# Patient Record
Sex: FEMALE | Race: WHITE | NOT HISPANIC OR LATINO | Employment: OTHER | URBAN - METROPOLITAN AREA
[De-identification: names, ages, dates, MRNs, and addresses within clinical notes are randomized per-mention and may not be internally consistent; named-entity substitution may affect disease eponyms.]

---

## 2022-06-06 DIAGNOSIS — G47.00 INSOMNIA: Primary | ICD-10-CM

## 2024-04-01 ENCOUNTER — LAB REQUISITION (OUTPATIENT)
Dept: LAB | Facility: CLINIC | Age: 74
End: 2024-04-01
Payer: COMMERCIAL

## 2024-04-01 DIAGNOSIS — D64.9 ANEMIA, UNSPECIFIED: ICD-10-CM

## 2024-04-02 LAB — HGB BLD-MCNC: 8.7 G/DL (ref 11.7–15.7)

## 2024-04-02 PROCEDURE — 85018 HEMOGLOBIN: CPT | Performed by: INTERNAL MEDICINE

## 2024-04-02 PROCEDURE — P9604 ONE-WAY ALLOW PRORATED TRIP: HCPCS | Performed by: INTERNAL MEDICINE

## 2024-04-02 PROCEDURE — 36415 COLL VENOUS BLD VENIPUNCTURE: CPT | Performed by: INTERNAL MEDICINE

## 2024-09-06 ENCOUNTER — HOSPITAL ENCOUNTER (EMERGENCY)
Facility: HOSPITAL | Age: 74
Discharge: HOME | End: 2024-09-07
Attending: EMERGENCY MEDICINE | Admitting: EMERGENCY MEDICINE
Payer: COMMERCIAL

## 2024-09-06 ENCOUNTER — APPOINTMENT (OUTPATIENT)
Dept: CT IMAGING | Facility: HOSPITAL | Age: 74
End: 2024-09-06
Attending: EMERGENCY MEDICINE
Payer: COMMERCIAL

## 2024-09-06 VITALS
SYSTOLIC BLOOD PRESSURE: 138 MMHG | WEIGHT: 110 LBS | OXYGEN SATURATION: 98 % | HEART RATE: 84 BPM | TEMPERATURE: 97 F | DIASTOLIC BLOOD PRESSURE: 76 MMHG | RESPIRATION RATE: 15 BRPM

## 2024-09-06 DIAGNOSIS — F10.929 ALCOHOLIC INTOXICATION WITH COMPLICATION (H): ICD-10-CM

## 2024-09-06 LAB
ALBUMIN SERPL BCG-MCNC: 3.9 G/DL (ref 3.5–5.2)
ALBUMIN UR-MCNC: NEGATIVE MG/DL
ALP SERPL-CCNC: 55 U/L (ref 40–150)
ALT SERPL W P-5'-P-CCNC: 11 U/L (ref 0–50)
AMPHETAMINES UR QL SCN: NORMAL
ANION GAP SERPL CALCULATED.3IONS-SCNC: 15 MMOL/L (ref 7–15)
APPEARANCE UR: CLEAR
AST SERPL W P-5'-P-CCNC: 26 U/L (ref 0–45)
BARBITURATES UR QL SCN: NORMAL
BASOPHILS # BLD AUTO: 0.1 10E3/UL (ref 0–0.2)
BASOPHILS NFR BLD AUTO: 1 %
BENZODIAZ UR QL SCN: NORMAL
BILIRUB DIRECT SERPL-MCNC: <0.2 MG/DL (ref 0–0.3)
BILIRUB SERPL-MCNC: <0.2 MG/DL
BILIRUB UR QL STRIP: NEGATIVE
BUN SERPL-MCNC: 16.9 MG/DL (ref 8–23)
BZE UR QL SCN: NORMAL
CALCIUM SERPL-MCNC: 8.7 MG/DL (ref 8.8–10.4)
CANNABINOIDS UR QL SCN: NORMAL
CHLORIDE SERPL-SCNC: 110 MMOL/L (ref 98–107)
COLOR UR AUTO: ABNORMAL
CREAT SERPL-MCNC: 0.75 MG/DL (ref 0.51–0.95)
EGFRCR SERPLBLD CKD-EPI 2021: 84 ML/MIN/1.73M2
EOSINOPHIL # BLD AUTO: 0.1 10E3/UL (ref 0–0.7)
EOSINOPHIL NFR BLD AUTO: 1 %
ERYTHROCYTE [DISTWIDTH] IN BLOOD BY AUTOMATED COUNT: 14.1 % (ref 10–15)
ETHANOL SERPL-MCNC: 0.28 G/DL
FENTANYL UR QL: NORMAL
GLUCOSE SERPL-MCNC: 95 MG/DL (ref 70–99)
GLUCOSE UR STRIP-MCNC: NEGATIVE MG/DL
HCO3 SERPL-SCNC: 20 MMOL/L (ref 22–29)
HCT VFR BLD AUTO: 40.1 % (ref 35–47)
HGB BLD-MCNC: 12.8 G/DL (ref 11.7–15.7)
HGB UR QL STRIP: NEGATIVE
HYALINE CASTS: 1 /LPF
IMM GRANULOCYTES # BLD: 0 10E3/UL
IMM GRANULOCYTES NFR BLD: 0 %
KETONES UR STRIP-MCNC: 20 MG/DL
LEUKOCYTE ESTERASE UR QL STRIP: NEGATIVE
LYMPHOCYTES # BLD AUTO: 4.3 10E3/UL (ref 0.8–5.3)
LYMPHOCYTES NFR BLD AUTO: 40 %
MAGNESIUM SERPL-MCNC: 1.9 MG/DL (ref 1.7–2.3)
MCH RBC QN AUTO: 29.6 PG (ref 26.5–33)
MCHC RBC AUTO-ENTMCNC: 31.9 G/DL (ref 31.5–36.5)
MCV RBC AUTO: 93 FL (ref 78–100)
MONOCYTES # BLD AUTO: 0.8 10E3/UL (ref 0–1.3)
MONOCYTES NFR BLD AUTO: 8 %
MUCOUS THREADS #/AREA URNS LPF: PRESENT /LPF
NEUTROPHILS # BLD AUTO: 5.2 10E3/UL (ref 1.6–8.3)
NEUTROPHILS NFR BLD AUTO: 49 %
NITRATE UR QL: NEGATIVE
NRBC # BLD AUTO: 0.1 10E3/UL
NRBC BLD AUTO-RTO: 1 /100
OPIATES UR QL SCN: NORMAL
PCP QUAL URINE (ROCHE): NORMAL
PH UR STRIP: 5.5 [PH] (ref 5–7)
PLAT MORPH BLD: ABNORMAL
PLATELET # BLD AUTO: ABNORMAL 10*3/UL
POTASSIUM SERPL-SCNC: 4.3 MMOL/L (ref 3.4–5.3)
PROT SERPL-MCNC: 6.4 G/DL (ref 6.4–8.3)
RBC # BLD AUTO: 4.32 10E6/UL (ref 3.8–5.2)
RBC MORPH BLD: ABNORMAL
RBC URINE: <1 /HPF
SODIUM SERPL-SCNC: 145 MMOL/L (ref 135–145)
SP GR UR STRIP: 1.02 (ref 1–1.03)
SQUAMOUS EPITHELIAL: 3 /HPF
UROBILINOGEN UR STRIP-MCNC: <2 MG/DL
WBC # BLD AUTO: 10.6 10E3/UL (ref 4–11)
WBC URINE: 1 /HPF

## 2024-09-06 PROCEDURE — 85041 AUTOMATED RBC COUNT: CPT | Performed by: EMERGENCY MEDICINE

## 2024-09-06 PROCEDURE — 99284 EMERGENCY DEPT VISIT MOD MDM: CPT | Mod: 25

## 2024-09-06 PROCEDURE — 83735 ASSAY OF MAGNESIUM: CPT | Performed by: EMERGENCY MEDICINE

## 2024-09-06 PROCEDURE — 82077 ASSAY SPEC XCP UR&BREATH IA: CPT | Performed by: EMERGENCY MEDICINE

## 2024-09-06 PROCEDURE — 36415 COLL VENOUS BLD VENIPUNCTURE: CPT | Performed by: EMERGENCY MEDICINE

## 2024-09-06 PROCEDURE — 80307 DRUG TEST PRSMV CHEM ANLYZR: CPT | Performed by: EMERGENCY MEDICINE

## 2024-09-06 PROCEDURE — 80053 COMPREHEN METABOLIC PANEL: CPT | Performed by: EMERGENCY MEDICINE

## 2024-09-06 PROCEDURE — 70450 CT HEAD/BRAIN W/O DYE: CPT

## 2024-09-06 PROCEDURE — 250N000013 HC RX MED GY IP 250 OP 250 PS 637: Performed by: EMERGENCY MEDICINE

## 2024-09-06 PROCEDURE — 81001 URINALYSIS AUTO W/SCOPE: CPT | Performed by: EMERGENCY MEDICINE

## 2024-09-06 PROCEDURE — 82248 BILIRUBIN DIRECT: CPT | Performed by: EMERGENCY MEDICINE

## 2024-09-06 RX ORDER — PROCHLORPERAZINE MALEATE 5 MG
5 TABLET ORAL ONCE
Status: COMPLETED | OUTPATIENT
Start: 2024-09-06 | End: 2024-09-06

## 2024-09-06 RX ORDER — GABAPENTIN 100 MG/1
300 CAPSULE ORAL ONCE
Status: COMPLETED | OUTPATIENT
Start: 2024-09-06 | End: 2024-09-06

## 2024-09-06 RX ADMIN — GABAPENTIN 300 MG: 100 CAPSULE ORAL at 22:40

## 2024-09-06 RX ADMIN — PROCHLORPERAZINE MALEATE 5 MG: 5 TABLET ORAL at 21:44

## 2024-09-06 ASSESSMENT — ACTIVITIES OF DAILY LIVING (ADL)
ADLS_ACUITY_SCORE: 35

## 2024-09-06 ASSESSMENT — COLUMBIA-SUICIDE SEVERITY RATING SCALE - C-SSRS
2. HAVE YOU ACTUALLY HAD ANY THOUGHTS OF KILLING YOURSELF IN THE PAST MONTH?: NO
6. HAVE YOU EVER DONE ANYTHING, STARTED TO DO ANYTHING, OR PREPARED TO DO ANYTHING TO END YOUR LIFE?: NO
1. IN THE PAST MONTH, HAVE YOU WISHED YOU WERE DEAD OR WISHED YOU COULD GO TO SLEEP AND NOT WAKE UP?: NO

## 2024-09-06 NOTE — ED TRIAGE NOTES
"Pt brought by EMS from [employer] due to alcohol intoxication. Pt lives alone [place] the [place] from [employer], and states she has been \"drinking for days\" and \"passing out and fall asleep\". Denies recent falls or injuries, denies suicidal ideation, denies drug abuse. Pt unable to walk due to intoxication, states she has been drinking vodka.  Glucose 112.        "

## 2024-09-06 NOTE — ED PROVIDER NOTES
EMERGENCY DEPARTMENT ENCOUNTER      NAME: Mallory Mckinney  AGE: 74 year old female  YOB: 1950  MRN: [id number]  EVALUATION DATE & TIME: 9/6/2024  5:04 PM    PCP: No Ref-Primary, Physician    ED PROVIDER: Angie Acevedo DO      Chief Complaint   Patient presents with    Alcohol Intoxication         FINAL IMPRESSION:  1. Alcoholic intoxication with complication (H24)          ED COURSE & MEDICAL DECISION MAKING:    Pertinent Labs & Imaging studies reviewed. (See chart for details)  5:13 PM I met the patient and performed my initial interview and exam.    74 year old female presents to the Emergency Department for evaluation of  alcohol intoxication.  Reportedly at the [employer] for home and someone called EMS.  Patient is disheveled and appears intoxicated here.  She reports she has been drinking quite a lot recently but is unable to tell me more.  She is oriented to place and self.  Difficult to obtain history.  No obvious signs of trauma but given intoxication altered mental status will obtain CT imaging of the head.  She tells me that she had been on gabapentin for some chronic right wrist pain, made her feel off so has been replacing this with alcohol.  She denies any thoughts of harm to self or others.  Labs are overall reassuring.  Blood alcohol level significantly elevated.  CT imaging of the head without fracture or bleed.  Patient observed in the ED for several hours.  She was eating and drinking and ambulating without difficulty.  I did discuss again with patient the room.  She admits to being an alcoholic.  She reports she has episodes of depression will binge drink.  She had been drinking for the past 3 days.  She reports she sometimes will go several weeks without drinking.  Sounds like her friends tend to drink a lot as well.  She does not drive.  Patient is now clinically sober.  She continues to denies any suicidal ideation.  She request discharge.  I think this is reasonable and  patient is not holdable.  Patient is calling a cab for ride home.  I gave her alcohol resources and did discuss return if any acute worsening symptoms or thought of self-harm.    At the conclusion of the encounter I discussed the results of all of the tests and the disposition. The questions were answered. The patient or family acknowledged understanding and was agreeable with the care plan.     Medical Decision Making  Obtained supplemental history:Supplemental history obtained?: No  Reviewed external records: External records reviewed?: Outpatient Record: Family Medicine Visit 6/25/24  Care impacted by chronic illness:Cancer/Chemotherapy, Hypertension, and Mental Health  Care significantly affected by social determinants of health:Alcohol Abuse and/or Recreational Drug Use  Did you consider but not order tests?: Work up considered but not performed and documented in chart, if applicable  Did you interpret images independently?: Independent interpretation of ECG and images noted in documentation, when applicable.  Consultation discussion with other provider:Did you involve another provider (consultant, MH, pharmacy, etc.)?: No  Discharge. No recommendations on prescription strength medication(s). I considered admission, but discharged patient after significant clinical improvement.      MEDICATIONS GIVEN IN THE EMERGENCY:  Medications   sodium chloride 0.9% BOLUS 1,000 mL (1,000 mLs Intravenous Not Given 9/6/24 2001)   prochlorperazine (COMPAZINE) tablet 5 mg (5 mg Oral $Given 9/6/24 2144)   gabapentin (NEURONTIN) capsule 300 mg (300 mg Oral $Given 9/6/24 2240)       NEW PRESCRIPTIONS STARTED AT TODAY'S ER VISIT  There are no discharge medications for this patient.         =================================================================    HPI    Patient information was obtained from: Patient    Use of : N/A       Mallory Mckinney is a 74 year old female with a pertinent history of HTN, depression,  "alcohol abuse, SDH, and breast cancer who presents to this ED via EMS for evaluation of alcohol intoxication.    The patient was outside [employer] today intoxicated and a bystander called 911 out of concern for the patient's safety. The patient lives across the street from [employer]. She admits to drinking \"too much\" and drinks daily. She repeatedly states \"I'm sick of drinking\" and \"I can't stand drinking.\" She lives alone and states she started drinking out of loneliness. She denies any suicidal ideation or pain. She does not use drugs. She states she used to take gabapentin for her wrist pain but has since stopped that because she was having improvement of her pain with drinking.      REVIEW OF SYSTEMS   Per HPI    PAST MEDICAL HISTORY:  No past medical history on file.    PAST SURGICAL HISTORY:  No past surgical history on file.        CURRENT MEDICATIONS:    No current outpatient medications on file.       ALLERGIES:  Allergies   Allergen Reactions    Morphine     Nuts      peanut    Oxycodone     Sulfa Antibiotics        FAMILY HISTORY:  No family history on file.    SOCIAL HISTORY:   Social History     Socioeconomic History    Marital status: Single     Social Determinants of Health     Financial Resource Strain: Low Risk  (10/22/2023)    Received from [facility]    Financial Resource Strain     Difficulty of Paying Living Expenses: 3   Food Insecurity: Food Insecurity Present (3/20/2024)    Received from [facility]    Food Insecurity     Worried About Running Out of Food in the Last Year: 2   Transportation Needs: No Transportation Needs (10/22/2023)    Received from [facility]    Transportation Needs     Lack of Transportation (Medical): 1   Physical Activity: Not on File (9/22/2023)    Received from [facility]    Physical Activity     Physical Activity: 0   Stress: Not at Risk (9/22/2023)    Received from [facility]    " Stress     Stress: 1   Social Connections: Socially Integrated (3/20/2024)    Received from [facility]    Social Connections     Frequency of Communication with Friends and Family: 0   Housing Stability: Medium Risk (3/20/2024)    Received from [facility]    Housing Stability     Unable to Pay for Housing in the Last Year: 2       VITALS:  /76   Pulse 84   Temp 97  F (36.1  C)   Resp 15   Wt 49.9 kg (110 lb)   SpO2 98%     PHYSICAL EXAM    Physical Exam  Constitutional:       General: She is not in acute distress.     Comments: Disheveled, intoxicated   HENT:      Head: Normocephalic and atraumatic.      Mouth/Throat:      Pharynx: Oropharynx is clear.   Eyes:      Pupils: Pupils are equal, round, and reactive to light.   Cardiovascular:      Rate and Rhythm: Normal rate and regular rhythm.      Pulses: Normal pulses.      Heart sounds: Normal heart sounds.   Pulmonary:      Effort: Pulmonary effort is normal.      Breath sounds: Normal breath sounds.   Abdominal:      General: Abdomen is flat. Bowel sounds are normal.      Palpations: Abdomen is soft.      Tenderness: There is no abdominal tenderness.   Musculoskeletal:         General: Normal range of motion.   Skin:     General: Skin is warm and dry.      Capillary Refill: Capillary refill takes less than 2 seconds.   Neurological:      General: No focal deficit present.      Mental Status: She is alert.      Comments: Oriented to person and place.  Slurred speech.   Psychiatric:         Thought Content: Thought content does not include suicidal ideation.        LAB:  All pertinent labs reviewed and interpreted.  Labs Ordered and Resulted from Time of ED Arrival to Time of ED Departure   BASIC METABOLIC PANEL - Abnormal       Result Value    Sodium 145      Potassium 4.3      Chloride 110 (*)     Carbon Dioxide (CO2) 20 (*)     Anion Gap 15      Urea Nitrogen 16.9      Creatinine 0.75       GFR Estimate 84      Calcium 8.7 (*)     Glucose 95     ETHYL ALCOHOL LEVEL - Abnormal    Alcohol ethyl 0.28 (*)    ROUTINE UA WITH MICROSCOPIC REFLEX TO CULTURE - Abnormal    Color Urine Light Yellow      Appearance Urine Clear      Glucose Urine Negative      Bilirubin Urine Negative      Ketones Urine 20 (*)     Specific Gravity Urine 1.018      Blood Urine Negative      pH Urine 5.5      Protein Albumin Urine Negative      Urobilinogen Urine <2.0      Nitrite Urine Negative      Leukocyte Esterase Urine Negative      Mucus Urine Present (*)     RBC Urine <1      WBC Urine 1      Squamous Epithelials Urine 3 (*)     Hyaline Casts Urine 1     CBC WITH PLATELETS AND DIFFERENTIAL - Abnormal    WBC Count 10.6      RBC Count 4.32      Hemoglobin 12.8      Hematocrit 40.1      MCV 93      MCH 29.6      MCHC 31.9      RDW 14.1      Platelet Count        % Neutrophils 49      % Lymphocytes 40      % Monocytes 8      % Eosinophils 1      % Basophils 1      % Immature Granulocytes 0      NRBCs per 100 WBC 1 (*)     Absolute Neutrophils 5.2      Absolute Lymphocytes 4.3      Absolute Monocytes 0.8      Absolute Eosinophils 0.1      Absolute Basophils 0.1      Absolute Immature Granulocytes 0.0      Absolute NRBCs 0.1     RBC AND PLATELET MORPHOLOGY - Abnormal    RBC Morphology Confirmed RBC Indices      Platelet Assessment Platelets Clumped (*)    HEPATIC FUNCTION PANEL - Normal    Protein Total 6.4      Albumin 3.9      Bilirubin Total <0.2      Alkaline Phosphatase 55      AST 26      ALT 11      Bilirubin Direct <0.20     MAGNESIUM - Normal    Magnesium 1.9     URINE DRUG SCREEN PANEL - Normal    Amphetamines Urine Screen Negative      Barbituates Urine Screen Negative      Benzodiazepine Urine Screen Negative      Cannabinoids Urine Screen Negative      Cocaine Urine Screen Negative      Fentanyl Qual Urine Screen Negative      Opiates Urine Screen Negative      PCP Urine Screen Negative         RADIOLOGY:  Reviewed  all pertinent imaging. Please see official radiology report.  Head CT w/o contrast   Final Result   IMPRESSION:   1.  No acute intracranial process.              I, David Burns, am serving as a scribe to document services personally performed by Dr. Angie Acevedo based on my observation and the provider's statements to me. I, Angie Acevedo, DO attest that David Burns is acting in a scribe capacity, has observed my performance of the services and has documented them in accordance with my direction.    Angie Acevedo DO  Emergency Medicine  [facility] EMERGENCY DEPARTMENT  [address] [place] [id number]  [phone]  Dept: [phone]     Angie Acevedo DO  09/07/24 0124

## 2024-09-06 NOTE — ED NOTES
"Patient resting on and off in bed. Needs frequent redirecting as patient asks repeated questions. Is pleasant and cooperative with interactions. States \"I've been drinking for days and I'm DONE drinking.\"     Writer attempted to draw blood from pt with pt's permission, difficult stick. Was able to obtain blood sample and sent to lab.     Pt oriented to situation. Has wrist brace on right wrist, states she is unsure why she has it on but states she needs to keep it on.  "

## 2024-09-07 NOTE — DISCHARGE INSTRUCTIONS
SUBSTANCE ABUSE RESOURCES    If you are interested in substance abuse treatment (or simply anassessment) and have private insurance, contact your insurance company to determine your coverage for substance abuse treatment and a list of in-network providers.     If you have no insurance, orMedicaid you may qualify for consolidated treatment funds through your county of residence. To find out if you are eligible, you will need a Rule 25 assessment and that  will make a treatment referral for you. Youwill also need a Rule 25 assessment if you have a Pre-paid Medical Assistance Plan (PMAP), however your designated insurance company would be authorizing the funding versus your county of residence. If you have Medicareonly, you likely have some limited treatment benefits, however depending on your income may also qualify for consolidated treatment funds which again you would need a Rule 25 assessment for.    Below is a listof just a few of the chemical dependency treatment programs in the [facility]. Some programs offer combined chemical dependency AND mental health treatment.    FACILITY LOCATION PHONE SERVICES   [facility] [phone] OP, IP   [facility] [phone] OP, Residential   [facility] Multiple Locations [phone] OP, [facility] [phone] OP, Residential   [facility] [address] Multiple Locations [phone] OP, women's [facility] Multiple [phone] OP, Residential   [facility] [phone] OP, Residential   [facility] [phone] OP, Residential   [facility] [phone] Residential (women)   [facility] [phone] OP, Residential   [facility] [phone] Residential (women)   [phone] Residential (men   [facility] Multiple Locations [phone]  Adolescent OP & Res.   [facility] Multiple Locations [phone] Adolescent OP   [facility] [phone] OP   [facility] [place] [phone] OP     * [place], [place], [facility], and [facility] have adult and adolescent services.    [facility] Rule 25 Phone Numbers    [employer] Phone   [place] [phone]   [place] [phone]   [place] [phone]   [place] [phone]   [place] [phone]   [place] [phone]     Community Agencies Providing Rule 25 Assessments    These agencies would need to be called to determine their current Rule25 schedule and if/when they offer walk-in appointments.    FACILITY PHONE NUMBER [facility]    Family Services [phone] Mon.-Thurs. by 6:30AM (any race)   [facility] [phone] Mon.-Fri. 7:00AM-4:30PM   [facility] [phone] Mon.-Fri. 8:00AM-4:30PM   [facility] [phone] Mondays 9:00AM-4:00PM   CLUES *Spanishspeaking*  Serv[facility]ios de habla hispana [phone] ([place])  [phone] ([place]) Call for more information  Llame para mas informacion   *Please contact the individualfacility for possible walk-in times and additional information.    Detox Facilities    DETOX FACILITY PHONE   [facility] ([address][phone]   [facility] (in [place]) [phone]   [facility] [phone]     12-Step Support Groups    SUPPORT GROUP WEBSITE CONTACT INFO   Alcoholics Anonymous www.[place].org  www.aastpaul.org [phone] ([facility])  [phone] (formerly [facility])   Narcotics Anonymous www.[place].org [phone]   Marijuana Anonymous www.marijuana-anonymous.org [phone]   Crystal Meth Anonymous www.crystalmeth.org   Cocaine Anonymous www.[employer]    [employer]-Z[employer]/Praful.ru-vodq-stxmmob-msp.org [phone] ([facility])  [phone] (formerly [facility])     Non-12 Step Support Groups    SUPPORT GROUP WEBSITE CONTACT INFO   [facility] For Sobriety  www.womenforsobriety.org [phone]   Addiction Busters at [facility] http://www.[facility].org/program1.html   Smart Recovery www.smartrecovery.org     Practice harm reduction if you cannot stop drinking: Consume 1 drink per hour, water down your drinks, stay put in a safe place (do not travel), never drink alone, remember to eat, never mix substances or alcohols,call 911 if you or someone else has passed out and cannot wake up.

## 2025-07-22 ENCOUNTER — LAB REQUISITION (OUTPATIENT)
Dept: LAB | Facility: CLINIC | Age: 75
End: 2025-07-22
Payer: COMMERCIAL

## 2025-07-22 DIAGNOSIS — I10 ESSENTIAL (PRIMARY) HYPERTENSION: ICD-10-CM

## 2025-07-24 LAB
ANION GAP SERPL CALCULATED.3IONS-SCNC: 11 MMOL/L (ref 7–15)
BUN SERPL-MCNC: 14.4 MG/DL (ref 8–23)
CALCIUM SERPL-MCNC: 8.9 MG/DL (ref 8.8–10.4)
CHLORIDE SERPL-SCNC: 110 MMOL/L (ref 98–107)
CREAT SERPL-MCNC: 0.82 MG/DL (ref 0.51–0.95)
EGFRCR SERPLBLD CKD-EPI 2021: 75 ML/MIN/1.73M2
GLUCOSE SERPL-MCNC: 188 MG/DL (ref 70–99)
HCO3 SERPL-SCNC: 21 MMOL/L (ref 22–29)
POTASSIUM SERPL-SCNC: 4.4 MMOL/L (ref 3.4–5.3)
SODIUM SERPL-SCNC: 142 MMOL/L (ref 135–145)

## 2025-07-24 PROCEDURE — 36415 COLL VENOUS BLD VENIPUNCTURE: CPT | Mod: ORL | Performed by: INTERNAL MEDICINE

## 2025-07-24 PROCEDURE — P9604 ONE-WAY ALLOW PRORATED TRIP: HCPCS | Mod: ORL | Performed by: INTERNAL MEDICINE

## 2025-07-24 PROCEDURE — 80048 BASIC METABOLIC PNL TOTAL CA: CPT | Mod: ORL | Performed by: INTERNAL MEDICINE

## 2025-07-28 ENCOUNTER — LAB REQUISITION (OUTPATIENT)
Dept: LAB | Facility: CLINIC | Age: 75
End: 2025-07-28
Payer: COMMERCIAL

## 2025-07-28 DIAGNOSIS — D64.9 ANEMIA, UNSPECIFIED: ICD-10-CM

## 2025-07-28 DIAGNOSIS — E87.6 HYPOKALEMIA: ICD-10-CM

## 2025-07-29 LAB
ANION GAP SERPL CALCULATED.3IONS-SCNC: 13 MMOL/L (ref 7–15)
BASOPHILS # BLD AUTO: 0.1 10E3/UL (ref 0–0.2)
BASOPHILS NFR BLD AUTO: 1 %
BUN SERPL-MCNC: 18 MG/DL (ref 8–23)
CALCIUM SERPL-MCNC: 9.1 MG/DL (ref 8.8–10.4)
CHLORIDE SERPL-SCNC: 106 MMOL/L (ref 98–107)
CREAT SERPL-MCNC: 1 MG/DL (ref 0.51–0.95)
EGFRCR SERPLBLD CKD-EPI 2021: 59 ML/MIN/1.73M2
EOSINOPHIL # BLD AUTO: 0.2 10E3/UL (ref 0–0.7)
EOSINOPHIL NFR BLD AUTO: 2 %
ERYTHROCYTE [DISTWIDTH] IN BLOOD BY AUTOMATED COUNT: 19.8 % (ref 10–15)
GLUCOSE SERPL-MCNC: 116 MG/DL (ref 70–99)
HCO3 SERPL-SCNC: 23 MMOL/L (ref 22–29)
HCT VFR BLD AUTO: 26.2 % (ref 35–47)
HGB BLD-MCNC: 7.6 G/DL (ref 11.7–15.7)
IMM GRANULOCYTES # BLD: 0.1 10E3/UL
IMM GRANULOCYTES NFR BLD: 1 %
LYMPHOCYTES # BLD AUTO: 2 10E3/UL (ref 0.8–5.3)
LYMPHOCYTES NFR BLD AUTO: 26 %
MCH RBC QN AUTO: 26.5 PG (ref 26.5–33)
MCHC RBC AUTO-ENTMCNC: 29 G/DL (ref 31.5–36.5)
MCV RBC AUTO: 91 FL (ref 78–100)
MONOCYTES # BLD AUTO: 1.1 10E3/UL (ref 0–1.3)
MONOCYTES NFR BLD AUTO: 14 %
NEUTROPHILS # BLD AUTO: 4.4 10E3/UL (ref 1.6–8.3)
NEUTROPHILS NFR BLD AUTO: 56 %
NRBC # BLD AUTO: 0 10E3/UL
NRBC BLD AUTO-RTO: 0 /100
PLATELET # BLD AUTO: 419 10E3/UL (ref 150–450)
POTASSIUM SERPL-SCNC: 4.3 MMOL/L (ref 3.4–5.3)
RBC # BLD AUTO: 2.87 10E6/UL (ref 3.8–5.2)
SODIUM SERPL-SCNC: 142 MMOL/L (ref 135–145)
WBC # BLD AUTO: 7.8 10E3/UL (ref 4–11)

## 2025-08-05 ENCOUNTER — LAB REQUISITION (OUTPATIENT)
Dept: LAB | Facility: CLINIC | Age: 75
End: 2025-08-05
Payer: COMMERCIAL

## 2025-08-05 DIAGNOSIS — N18.2 CHRONIC KIDNEY DISEASE, STAGE 2 (MILD): ICD-10-CM

## 2025-08-05 DIAGNOSIS — R53.1 WEAKNESS: ICD-10-CM
